# Patient Record
(demographics unavailable — no encounter records)

---

## 2025-01-28 NOTE — ASSESSMENT
[FreeTextEntry1] : 52 yo RH woman with paroxysmal events.  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  Plan: 1.  Diagnostic video EEG monitoring in the EMU to characterize events and confirm diagnosis, which is medically necessary to guide treatment and management.   Recommend holding AEDs during monitoring to capture events and determine if both epileptic and non-epileptic events coexist.  May not need OXC upon discharge (causing adverse effects). 2. Continue current AEDs for now. 3. Seizure precautions, including no driving or operating heavy equipment, no unsupervised swimming, using a shower instead of a bathtub, no climbing ladders or working at elevations, no use of sharp dangerous tools or devices.  4. Patient agrees with plan.  5. Follow up after testing completed.   Gildardo King MD [facility]  I have spent 45 minutes of time for this encounter.  More than 50% of time spent counseling and educating patient about epilepsy specific safety issues including ASM side effects and interactions, alcohol consumption, sleep deprivation, risks and driving privileges associated with the [place] Guidelines, what is SUDEP and death related to seizures/SUDEP, seizure 1st aid and risks. Patient is educated on seizure precautions, including no driving, no operating machinery, no swimming or bathing, no climbing heights, or engage in any risky activities during which a seizure could cause further injury to pt or others.

## 2025-01-28 NOTE — PHYSICAL EXAM
[FreeTextEntry1] : Awake, alert, oriented x 3.  Language fluent.  Comprehension intact.  Naming intact.  Repetition intact.  Affect normal.  Cranial nerves grossly intact.  Motor exam: normal bulk, normal tone, 5/5 in all four extremities.  No tremors or fasciculations.  Sensory exam: intact to LT.   Coordination: no dysmetria.  Gait: ambulates independently, but unsteady.

## 2025-01-28 NOTE — HISTORY OF PRESENT ILLNESS
[FreeTextEntry1] : 52 yo RH woman, recently admitted to [facility] after a suspected breakthrough seizure.  Accompanied by her .  First seizure occurred 25 years ago, when she had 16 seizures in 8 months.  She was treated with CBZ, but this was switched to LTG prior to getting pregnant.  LTG was very effective until she got COVID then had a breakthrough seizure a few months later.  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  When these events started, they were typically triggered by stress or anxiety.  She denies any major traumatic events in the past.  She has 2 videos of these events during which patient is turning her head left and right, but is able to respond with slurred speech.  The events are prolonged.    Typical seizure description (seizures that started 25 yrs ago and are under control with AEDs): her typical seizures begin with an aura of jaquelin vu, she loses awareness, then this might progress to a GTC seizure.   She once broke her nose with a GTC, and once required stitches in the back of her scalp after a GTC.  Seizure Frequency: her last typical seizure occurred about 20 years ago  EEGs in the past when seizures began 25 yrs ago were reportedly abnormal (abnormality on one side of the brain).  Current AEDs: LTG ER 400mg PO QHS OXC ER 900mg PO QHS (this was added by her neurologist because of recurring events, causing dizziness and unsteadiness) Klonopin 0.5mg PO TID prn  Previous AEDs:  CBZ  Other meds: Lexapro 40mg PO QHS Ropinirole ER 4mg PO QHS (for RLS)   Discharge summary: "52yo F, PMH seizure disorder, depression, restless leg syndrome,  presents to ED after unusual seizure episode today. Patient is alert and able  to answer questions but does have some confusion. Her  is bedside and he  has explained that this morning patient had nausea and dizziness and then  looked as if she were asleep but with twitching of the extremities and had  garbled speech, nonsensical language. He said she seemed to understand what she  was saying, but he did not. He gave her her rescue medication (intranasal  valium?) and she improved. She states that normally right before a normal petit  mal seizure she has episode of jaquelin vu, and she knows to take  Klonopin/intranasal Valium to soni the seizure. Today, her symptoms prior to  seizure episode was not like usual so she did not take the medication in time.  In ED, patient was not able to speak fluently upon arrival. Upon my exam, she  was able to speak clearly but slowly. She notes she feels that her eyes are  heavy, she feels weak, and sees a line going across. She also felt pins and  needles in tongue and arms BL. Her  also notes that she had not had a  seizure episode in several years until she had COVID and received the vaccine a  couple years ago. She denies recent illness, tick bites. 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 She reports being started on Escitalopram following the death of  her mother. She denies chest pain, SOB, dysuria, BLE swelling, abd pain.  CT head/CTA head/neck unremarkable  Given 2mg Ativan in ED (07 Jul 2024 22:38)      ---  HOSPITAL COURSE:  Patient was admitted for seizure-like activity at home. Neurology was  consulted. Lamictal was increased to 225mg daily. EEG was negative for seizure.  CTA Head/Neck was negative for infarction. MR Head revealed Mild chronic  microvascular changes. No acute infarction. Patient reported paresthesias and  vertigo during admission, that gradually improved. No seizure-like activity  observed during hospitalization.    Patient seen and examined on day of discharge. She reports resolved  paresthesias, and improving vertigo when focusing vision. She was  hemodynamically stable and medically optimized for discharge home with  outpatient Neuro follow-up.

## 2025-04-11 NOTE — HISTORY OF PRESENT ILLNESS
[FreeTextEntry1] :   One event recently, passed out and woke up on floor covered in vomit, with LTG ER pills fully formed in vomit even though she took the dose several hours ago.  She then did not recover fully until 3 days later.  She has Valtaco and Clonazepam prn at home.  If this happens again, will report to [facility] ER for EMU monitoring, and to check prolactin, CPK, lactate in ER.  She does have anxiety, is on Lexapro, and agrees to psychology evaluation.
[FreeTextEntry1] :   One event recently, passed out and woke up on floor covered in vomit, with LTG ER pills fully formed in vomit even though she took the dose several hours ago.  She then did not recover fully until 3 days later.  She has Valtaco and Clonazepam prn at home.  If this happens again, will report to [facility] ER for EMU monitoring, and to check prolactin, CPK, lactate in ER.  She does have anxiety, is on Lexapro, and agrees to psychology evaluation.
no